# Patient Record
Sex: MALE | Race: BLACK OR AFRICAN AMERICAN | NOT HISPANIC OR LATINO | Employment: OTHER | URBAN - METROPOLITAN AREA
[De-identification: names, ages, dates, MRNs, and addresses within clinical notes are randomized per-mention and may not be internally consistent; named-entity substitution may affect disease eponyms.]

---

## 2018-06-12 ENCOUNTER — HOSPITAL ENCOUNTER (EMERGENCY)
Facility: OTHER | Age: 33
Discharge: HOME | End: 2018-06-12
Attending: EMERGENCY MEDICINE
Payer: MEDICAID

## 2018-06-12 VITALS
TEMPERATURE: 98 F | RESPIRATION RATE: 16 BRPM | SYSTOLIC BLOOD PRESSURE: 134 MMHG | DIASTOLIC BLOOD PRESSURE: 83 MMHG | HEART RATE: 62 BPM | OXYGEN SATURATION: 98 %

## 2018-06-12 DIAGNOSIS — K05.6 PERIODONTAL DISEASE: Primary | ICD-10-CM

## 2018-06-12 DIAGNOSIS — R22.0 FACIAL SWELLING: ICD-10-CM

## 2018-06-12 LAB
ANION GAP SERPL CALC-SCNC: 11 MMOL/L
BASOPHILS # BLD AUTO: 0.04 K/UL
BASOPHILS NFR BLD: 0.4 %
BUN SERPL-MCNC: 6 MG/DL
CALCIUM SERPL-MCNC: 10.1 MG/DL
CHLORIDE SERPL-SCNC: 102 MMOL/L
CO2 SERPL-SCNC: 26 MMOL/L
CREAT SERPL-MCNC: 0.9 MG/DL
DIFFERENTIAL METHOD: ABNORMAL
EOSINOPHIL # BLD AUTO: 0 K/UL
EOSINOPHIL NFR BLD: 0.1 %
ERYTHROCYTE [DISTWIDTH] IN BLOOD BY AUTOMATED COUNT: 13.8 %
EST. GFR  (AFRICAN AMERICAN): >60 ML/MIN/1.73 M^2
EST. GFR  (NON AFRICAN AMERICAN): >60 ML/MIN/1.73 M^2
GLUCOSE SERPL-MCNC: 95 MG/DL
HCT VFR BLD AUTO: 46.7 %
HGB BLD-MCNC: 15.5 G/DL
LYMPHOCYTES # BLD AUTO: 1.7 K/UL
LYMPHOCYTES NFR BLD: 17.9 %
MCH RBC QN AUTO: 30.1 PG
MCHC RBC AUTO-ENTMCNC: 33.2 G/DL
MCV RBC AUTO: 91 FL
MONOCYTES # BLD AUTO: 1 K/UL
MONOCYTES NFR BLD: 10 %
NEUTROPHILS # BLD AUTO: 6.9 K/UL
NEUTROPHILS NFR BLD: 71.1 %
PLATELET # BLD AUTO: 255 K/UL
PMV BLD AUTO: 9.1 FL
POTASSIUM SERPL-SCNC: 3.5 MMOL/L
RBC # BLD AUTO: 5.15 M/UL
SODIUM SERPL-SCNC: 139 MMOL/L
WBC # BLD AUTO: 9.69 K/UL

## 2018-06-12 PROCEDURE — 99284 EMERGENCY DEPT VISIT MOD MDM: CPT | Mod: 25

## 2018-06-12 PROCEDURE — 96365 THER/PROPH/DIAG IV INF INIT: CPT

## 2018-06-12 PROCEDURE — 85025 COMPLETE CBC W/AUTO DIFF WBC: CPT

## 2018-06-12 PROCEDURE — 63600175 PHARM REV CODE 636 W HCPCS: Performed by: PHYSICIAN ASSISTANT

## 2018-06-12 PROCEDURE — 96375 TX/PRO/DX INJ NEW DRUG ADDON: CPT | Mod: 59

## 2018-06-12 PROCEDURE — S0077 INJECTION, CLINDAMYCIN PHOSP: HCPCS | Performed by: PHYSICIAN ASSISTANT

## 2018-06-12 PROCEDURE — 25000003 PHARM REV CODE 250: Performed by: PHYSICIAN ASSISTANT

## 2018-06-12 PROCEDURE — 25500020 PHARM REV CODE 255: Performed by: EMERGENCY MEDICINE

## 2018-06-12 PROCEDURE — 80048 BASIC METABOLIC PNL TOTAL CA: CPT

## 2018-06-12 RX ORDER — CLINDAMYCIN HYDROCHLORIDE 150 MG/1
450 CAPSULE ORAL EVERY 8 HOURS
Qty: 63 CAPSULE | Refills: 0 | Status: SHIPPED | OUTPATIENT
Start: 2018-06-12 | End: 2018-06-19

## 2018-06-12 RX ORDER — CLINDAMYCIN PHOSPHATE 900 MG/50ML
900 INJECTION, SOLUTION INTRAVENOUS
Status: COMPLETED | OUTPATIENT
Start: 2018-06-12 | End: 2018-06-12

## 2018-06-12 RX ORDER — KETOROLAC TROMETHAMINE 30 MG/ML
15 INJECTION, SOLUTION INTRAMUSCULAR; INTRAVENOUS
Status: COMPLETED | OUTPATIENT
Start: 2018-06-12 | End: 2018-06-12

## 2018-06-12 RX ORDER — IBUPROFEN 800 MG/1
800 TABLET ORAL EVERY 6 HOURS PRN
Qty: 15 TABLET | Refills: 0 | Status: SHIPPED | OUTPATIENT
Start: 2018-06-12

## 2018-06-12 RX ORDER — OXYCODONE AND ACETAMINOPHEN 5; 325 MG/1; MG/1
1 TABLET ORAL
Status: COMPLETED | OUTPATIENT
Start: 2018-06-12 | End: 2018-06-12

## 2018-06-12 RX ADMIN — OXYCODONE HYDROCHLORIDE AND ACETAMINOPHEN 1 TABLET: 5; 325 TABLET ORAL at 04:06

## 2018-06-12 RX ADMIN — CLINDAMYCIN IN 5 PERCENT DEXTROSE 900 MG: 18 INJECTION, SOLUTION INTRAVENOUS at 05:06

## 2018-06-12 RX ADMIN — KETOROLAC TROMETHAMINE 15 MG: 30 INJECTION, SOLUTION INTRAMUSCULAR at 05:06

## 2018-06-12 RX ADMIN — IOHEXOL 75 ML: 350 INJECTION, SOLUTION INTRAVENOUS at 06:06

## 2018-06-12 NOTE — ED PROVIDER NOTES
Encounter Date: 6/12/2018       History     Chief Complaint   Patient presents with    Facial Swelling     woke up with swelling to right face. reports bad teeth on right upper. Swelling up into eye.      Patient is a 33-year-old male with no pertinent past medical history presents to the ED with facial pain and swelling.  Patient states last night he noticed pain and swelling to his right cheek.  He states the pain and swelling has progressively worsened and spread near his right eye overnight. He reports poor long term.  Denies difficulty swallowing.  He states he has felt intermittently short of breath but attributes this to his congestion.  He denies any drainage.  He reports subjective fever.  He reports taking Tylenol this morning for his pain. He also reports blurry vision to his right eye.       The history is provided by the patient.     Review of patient's allergies indicates:  No Known Allergies  Past Medical History:   Diagnosis Date    Asthma      History reviewed. No pertinent surgical history.  No family history on file.  Social History   Substance Use Topics    Smoking status: Current Every Day Smoker     Packs/day: 1.00     Years: 10.00     Types: Cigarettes    Smokeless tobacco: Never Used    Alcohol use Yes      Comment: Socially     Review of Systems   Constitutional: Positive for fever. Negative for chills.   HENT: Positive for facial swelling. Negative for congestion, sore throat and trouble swallowing.    Eyes: Positive for visual disturbance. Negative for pain.   Respiratory: Negative for shortness of breath.    Cardiovascular: Negative for chest pain.   Gastrointestinal: Negative for abdominal pain, diarrhea, nausea and vomiting.   Genitourinary: Negative for dysuria.   Musculoskeletal: Negative for arthralgias.   Skin: Negative for rash.   Neurological: Negative for headaches.       Physical Exam     Initial Vitals [06/12/18 1546]   BP Pulse Resp Temp SpO2   (!) 153/99 81 16 98.8 °F  (37.1 °C) 99 %      MAP       --         Physical Exam    Constitutional: Vital signs are normal. He is cooperative.   HENT:   Head: Normocephalic and atraumatic.   Significant swelling noted to the right side of the face extending to the posterior periorbital area.  No overlying erythema or warmth to this area. Tenderness to palpation with induration to the superior to the right, maxillary area.  No oral abscess.  No sublingual elevation. Overall poor dentition with absent upper, right 2nd molar   Eyes: EOM are normal. Pupils are equal, round, and reactive to light.   Neck: Normal range of motion. Neck supple.   Cardiovascular: Normal rate, regular rhythm and intact distal pulses.   Pulmonary/Chest: Breath sounds normal. He has no wheezes. He has no rhonchi. He has no rales.   Abdominal: Soft. Bowel sounds are normal. There is no tenderness.   Musculoskeletal: Normal range of motion. He exhibits no edema.   Neurological: He is alert. No cranial nerve deficit. GCS eye subscore is 4. GCS verbal subscore is 5. GCS motor subscore is 6.   Skin: Skin is warm and dry. Capillary refill takes less than 2 seconds. No rash noted.   Psychiatric: He has a normal mood and affect. His behavior is normal.         ED Course   Procedures  Labs Reviewed   CBC W/ AUTO DIFFERENTIAL - Abnormal; Notable for the following:        Result Value    MPV 9.1 (*)     Lymph% 17.9 (*)     All other components within normal limits   BASIC METABOLIC PANEL         Imaging Results          CT Maxillofacial With Contrast (Final result)  Result time 06/12/18 18:51:35    Final result by Chato Quinonez MD (06/12/18 18:51:35)                 Impression:      Diffuse soft tissue swelling in the right anterior soft tissues in the region of the pre-mandibular and pre-maxillary regions with extension into the right preorbital regions.  Associated periodontal disease with multiple periapical lucencies.  No definitive drainable collection.  The findings most  likely represent diffuse cellulitis secondary to periodontal disease.  Suggest dental medicine follow-up.    Paranasal sinus disease.    Reactive jugular chain lymphadenopathy.      Electronically signed by: Chato Quinonez MD  Date:    06/12/2018  Time:    18:51             Narrative:    EXAMINATION:  CT MAXILLOFACIAL WITH CONTRAST    CLINICAL HISTORY:  Mass, lump or swelling, maxface;    TECHNIQUE:  Axial CT scan of the maxillofacial structures were obtained after the intravenous administration of 75 cc of Omni 350 IV.  Coronal and sagittal reformats were obtained.    COMPARISON:  None    FINDINGS:  There is no abnormal intracranial enhancement.  The visualized intracranial compartment is within normal limits.    The orbits and intraorbital contents are within normal limits.  There are secretions within the bilateral maxillary and ethmoid sinuses.  The frontal sinuses and the remainder of the paranasal sinuses are within normal limits.  The mastoid air cells are clear.    The nasal cavity is unremarkable.  There is multicarious dental disease with multiple periapical lucencies.  No definitive periodontal collection is identified.  The oropharynx and nasopharynx are within normal limits.    There is diffuse soft tissue thickening involving the right anterior facial soft tissues involving the right pre mandibular and pre maxillary regions with extension into the right preorbital regions.  No drainable collection is identified.    The parotid and submandibular glands are unremarkable.  There are enlarged jugular chain lymph nodes.  These are most likely reactive in nature.    There is normal intravascular enhancement.                                CT Maxillofacial With Contrast    (Results Pending)        Medical Decision Making:   Initial Assessment:   Urgent evaluation of a 33 y.o. male presenting to the emergency department complaining of facial swelling. Patient is afebrile, nontoxic appearing and hemodynamically  stable.  Patient with significant facial swelling and blurry vision.  No deficits on visual acuity.  Full there is no sign of airway compromise.  Possible apical abscess.  Will provide patient with pain medication, anti-inflammatories, antibiotics and obtain imaging to further assess swelling.  ED Management:  Lab work reveals no abnormalities.  CT reveals diffuse soft tissue swelling in the right anterior soft tissues in the pre mandibular cream axillary regions extending into the right preorbital region.  There is associated periodontal disease of multiple periapical lucencies.  There is no definitive drainable collection.  Findings represent diffuse cellulitis secondary to periodontal disease.  Paranasal sinus disease also present.  Patient received IV clindamycin here in the ED.  I am comfortable with him receiving outpatient course antibiotics.  Patient was given dental resource sheet and eyes on strict follow-up.  He is given specific return precautions.  He has no other complaints at this time is stable for discharge.  I have discussed the patient with the attending thoroughly and he/she agrees to the treatment and plan. This note was created using [facility] Dictation. There may be typographical errors secondary to dictation.                       Clinical Impression:     1. Periodontal disease    2. Facial swelling                               Rigo Gillette PA-C  06/12/18 1942

## 2018-06-12 NOTE — ED NOTES
Pt presents to ED by self with complaints of facial swelling x Last PM. Pt reports swelling, pain to R head and face since last night. Pt reports some SOB last night, no SOB at this encounter. Pt rates pain 10/10 in R face, jaw. Describes pain as aching and constant. Pain reproducible on palpation. No alleviating factors. Pt reports blurred vision in R eye. Pupils are equal, round and reactive to light. Numbness/tingling to R jaw and cheek. Pt denies any fever/chills, CP, N/V/D. AAOx4, RR even and unlabored. Will continue to monitor.

## 2018-06-12 NOTE — ED NOTES
Rounding  has been complete. Pt resting on recliner with antibody fluids infusing over 60 min. NAD noted. No increase swelling noted to face. Call bell within reach. Will continue to monitor.